# Patient Record
Sex: FEMALE | Race: WHITE | HISPANIC OR LATINO | URBAN - METROPOLITAN AREA
[De-identification: names, ages, dates, MRNs, and addresses within clinical notes are randomized per-mention and may not be internally consistent; named-entity substitution may affect disease eponyms.]

---

## 2021-03-19 ENCOUNTER — OFFICE VISIT (OUTPATIENT)
Dept: URBAN - METROPOLITAN AREA CLINIC 78 | Facility: CLINIC | Age: 31
End: 2021-03-19
Payer: SELF-PAY

## 2021-03-19 ENCOUNTER — TELEPHONE ENCOUNTER (OUTPATIENT)
Dept: URBAN - METROPOLITAN AREA CLINIC 78 | Facility: CLINIC | Age: 31
End: 2021-03-19

## 2021-03-19 ENCOUNTER — WEB ENCOUNTER (OUTPATIENT)
Dept: URBAN - METROPOLITAN AREA CLINIC 78 | Facility: CLINIC | Age: 31
End: 2021-03-19

## 2021-03-19 VITALS
RESPIRATION RATE: 16 BRPM | HEART RATE: 81 BPM | BODY MASS INDEX: 26.92 KG/M2 | WEIGHT: 161.6 LBS | HEIGHT: 65 IN | SYSTOLIC BLOOD PRESSURE: 122 MMHG | TEMPERATURE: 97.9 F | DIASTOLIC BLOOD PRESSURE: 84 MMHG

## 2021-03-19 DIAGNOSIS — R10.13 EPIGASTRIC PAIN: ICD-10-CM

## 2021-03-19 DIAGNOSIS — Z86.19 H/O HELICOBACTER INFECTION: ICD-10-CM

## 2021-03-19 DIAGNOSIS — R07.9 CHEST PAIN, UNSPECIFIED TYPE: ICD-10-CM

## 2021-03-19 PROCEDURE — 99203 OFFICE O/P NEW LOW 30 MIN: CPT | Performed by: INTERNAL MEDICINE

## 2021-03-19 RX ORDER — PANTOPRAZOLE SODIUM 40 MG/1
1 TABLET 30 MINUTES BEFORE BREAKFAST AND 30 MINUTES BEFORE DINNER TABLET, DELAYED RELEASE ORAL BID
Qty: 60 | Refills: 2 | OUTPATIENT
Start: 2021-03-19

## 2021-03-19 NOTE — HPI-TODAY'S VISIT:
The patient was referred to us by [facility] for evaluation of chest pain. A copy of this note will be sent to the referring physician.   The patient developed chest pain  along with a burning sensation along the epigastric and esophageal regions, radiating under the left breast a couple of weeks ago. She was seen by her PCP who started her on  Medrol Dosepak assuming it was muscular in nature and referred her to the cardiologist. She was told her heart looked normal. She was having a that point more of a retrosternal burning and was started on PPI by the cardiologist.  She had a negative stress test and is still awaiting the results of the ECHO.  She denied fever, upper respiratory symptoms, shortness of breath or difficulty in breathing.   She has been using Prilosec 20mg 2 tablets QAM with only very limited results.  She is very anxious and feels that stress may be contributing to some of her symptoms. She just started seeing a Psychotherapist. She is eating very healthy currently.  She otherwise denies any history of rectal bleeding, abdominal pain, constipation, diarrhea, bloating, anorexia or unintentional weight loss. She has not had heartburn or dysphagia. She has  not had further nausea or vomting.   She has a remote history of H pylori infection diagnosed at the age of 10.  The patient does not take blood thinners.     The patient has never had a colonoscopy previously.  There is no FH of colon cancer or colon polyps. There is  Summary of prior workup: - CXR 2/27/21: No acute disease.

## 2021-03-31 ENCOUNTER — OFFICE VISIT (OUTPATIENT)
Dept: URBAN - METROPOLITAN AREA CLINIC 98 | Facility: CLINIC | Age: 31
End: 2021-03-31

## 2021-04-01 ENCOUNTER — WEB ENCOUNTER (OUTPATIENT)
Dept: URBAN - METROPOLITAN AREA CLINIC 78 | Facility: CLINIC | Age: 31
End: 2021-04-01

## 2021-06-23 ENCOUNTER — OFFICE VISIT (OUTPATIENT)
Dept: URBAN - METROPOLITAN AREA SURGERY CENTER 15 | Facility: SURGERY CENTER | Age: 31
End: 2021-06-23

## 2021-07-13 ENCOUNTER — OFFICE VISIT (OUTPATIENT)
Dept: URBAN - METROPOLITAN AREA CLINIC 78 | Facility: CLINIC | Age: 31
End: 2021-07-13
Payer: SELF-PAY

## 2021-07-13 VITALS
BODY MASS INDEX: 24.12 KG/M2 | TEMPERATURE: 97.8 F | HEART RATE: 79 BPM | HEIGHT: 65 IN | SYSTOLIC BLOOD PRESSURE: 118 MMHG | WEIGHT: 144.8 LBS | DIASTOLIC BLOOD PRESSURE: 76 MMHG

## 2021-07-13 DIAGNOSIS — E73.9 LACTOSE INTOLERANCE: ICD-10-CM

## 2021-07-13 DIAGNOSIS — R07.9 CHEST PAIN, UNSPECIFIED TYPE: ICD-10-CM

## 2021-07-13 DIAGNOSIS — R63.4 UNINTENTIONAL WEIGHT LOSS: ICD-10-CM

## 2021-07-13 DIAGNOSIS — R10.13 EPIGASTRIC PAIN: ICD-10-CM

## 2021-07-13 PROCEDURE — 99214 OFFICE O/P EST MOD 30 MIN: CPT | Performed by: INTERNAL MEDICINE

## 2021-07-13 RX ORDER — FAMOTIDINE 40 MG/1
1 TABLET TABLET, FILM COATED ORAL
Qty: 60 | Refills: 2 | OUTPATIENT
Start: 2021-07-13

## 2021-07-13 RX ORDER — PANTOPRAZOLE SODIUM 40 MG/1
1 TABLET 30 MINUTES BEFORE BREAKFAST AND 30 MINUTES BEFORE DINNER TABLET, DELAYED RELEASE ORAL BID
Qty: 60 | Refills: 2 | Status: ACTIVE | COMMUNITY
Start: 2021-03-19

## 2021-07-13 NOTE — HPI-TODAY'S VISIT:
The patient was referred to us by [facility] for evaluation of chest pain. A copy of this note will be sent to the referring physician.   The patient developed chest pain  along with a burning sensation along the epigastric and esophageal regions, radiating under the left breast a couple of weeks ago. She was seen by her PCP who started her on  Medrol Dosepak assuming it was muscular in nature and referred her to the cardiologist. She was told her heart looked normal. She was having a that point more of a retrosternal burning and was started on PPI by the cardiologist.  She had a negative stress test and ECHO.  She denied fever, upper respiratory symptoms, shortness of breath or difficulty in breathing.   She is very anxious and feels that stress may be contributing to some of her symptoms. She just started seeing a Psychotherapist.  She was feeling worse and worse and was unable to get in for an EGD with me in a timely fashion, hence  ended up having it with Dr. Kenzie Padron. She was diagnosed with H pylori once again and was treated with Metronidazole + Tetracycline + PeptoBismol. She had a follow up H pylori breath test  while on PPIs that was negative (doubt validity).  She has continued limiting sevetal foods from her diet and did lose a total of 20lbs but her weight has since stabilized.  She has been avoiding fatty meals and red meats.   She has continued noticing a retrosternal burning as well as epigastric and RUQ burning.   She otherwise denies any history of rectal bleeding, abdominal pain, constipation, diarrhea, bloating or anorexia. She has not had heartburn or dysphagia. She has  not had further nausea or vomting.   She has a remote history of H pylori infection diagnosed at the age of 10.  The patient does not take blood thinners.     The patient has never had a colonoscopy previously.  There is no FH of colon cancer or colon polyps.    Summary of prior workup: - EGD by Dr. Kenzie Padron on 4/16/21: Grade A esophagitis. H pylori gastritis without GIM. Normal duodenum. - CXR 2/27/21: No acute disease.

## 2021-07-21 ENCOUNTER — OFFICE VISIT (OUTPATIENT)
Dept: URBAN - METROPOLITAN AREA CLINIC 77 | Facility: CLINIC | Age: 31
End: 2021-07-21
Payer: SELF-PAY

## 2021-07-21 DIAGNOSIS — R10.13 EPIGASTRIC ABDOMINAL PAIN: ICD-10-CM

## 2021-07-21 PROCEDURE — 76705 ECHO EXAM OF ABDOMEN: CPT | Performed by: INTERNAL MEDICINE

## 2021-07-21 RX ORDER — PANTOPRAZOLE SODIUM 40 MG/1
1 TABLET 30 MINUTES BEFORE BREAKFAST AND 30 MINUTES BEFORE DINNER TABLET, DELAYED RELEASE ORAL BID
Qty: 60 | Refills: 2 | Status: ACTIVE | COMMUNITY
Start: 2021-03-19

## 2021-07-21 RX ORDER — FAMOTIDINE 40 MG/1
1 TABLET TABLET, FILM COATED ORAL
Qty: 60 | Refills: 2 | Status: ACTIVE | COMMUNITY
Start: 2021-07-13

## 2021-08-13 ENCOUNTER — OFFICE VISIT (OUTPATIENT)
Dept: URBAN - METROPOLITAN AREA CLINIC 77 | Facility: CLINIC | Age: 31
End: 2021-08-13
Payer: SELF-PAY

## 2021-08-13 DIAGNOSIS — A04.8 HELICOBACTER PYLORI (H. PYLORI): ICD-10-CM

## 2021-08-13 PROCEDURE — 83014 H PYLORI DRUG ADMIN: CPT | Performed by: INTERNAL MEDICINE

## 2021-08-20 ENCOUNTER — OFFICE VISIT (OUTPATIENT)
Dept: URBAN - METROPOLITAN AREA CLINIC 114 | Facility: CLINIC | Age: 31
End: 2021-08-20
Payer: SELF-PAY

## 2021-08-20 DIAGNOSIS — A04.8 HELICOBACTER PYLORI (H. PYLORI): ICD-10-CM

## 2021-08-20 PROCEDURE — 83013 H PYLORI (C-13) BREATH: CPT | Performed by: INTERNAL MEDICINE

## 2021-08-24 ENCOUNTER — OFFICE VISIT (OUTPATIENT)
Dept: URBAN - METROPOLITAN AREA CLINIC 78 | Facility: CLINIC | Age: 31
End: 2021-08-24

## 2021-09-02 ENCOUNTER — DASHBOARD ENCOUNTERS (OUTPATIENT)
Age: 31
End: 2021-09-02

## 2021-09-02 ENCOUNTER — OFFICE VISIT (OUTPATIENT)
Dept: URBAN - METROPOLITAN AREA CLINIC 78 | Facility: CLINIC | Age: 31
End: 2021-09-02
Payer: SELF-PAY

## 2021-09-02 VITALS
SYSTOLIC BLOOD PRESSURE: 126 MMHG | BODY MASS INDEX: 25.09 KG/M2 | DIASTOLIC BLOOD PRESSURE: 84 MMHG | HEART RATE: 82 BPM | WEIGHT: 150.6 LBS | TEMPERATURE: 96.9 F | HEIGHT: 65 IN

## 2021-09-02 DIAGNOSIS — R10.13 EPIGASTRIC PAIN: ICD-10-CM

## 2021-09-02 DIAGNOSIS — R07.89 ACUTE CHEST WALL PAIN: ICD-10-CM

## 2021-09-02 DIAGNOSIS — E73.9 LACTOSE INTOLERANCE: ICD-10-CM

## 2021-09-02 DIAGNOSIS — Z86.19 H/O HELICOBACTER INFECTION: ICD-10-CM

## 2021-09-02 DIAGNOSIS — R63.4 UNINTENTIONAL WEIGHT LOSS: ICD-10-CM

## 2021-09-02 PROBLEM — 782415009: Status: ACTIVE | Noted: 2021-07-13

## 2021-09-02 PROCEDURE — 99214 OFFICE O/P EST MOD 30 MIN: CPT | Performed by: INTERNAL MEDICINE

## 2021-09-02 RX ORDER — FAMOTIDINE 40 MG/1
1 TABLET TABLET, FILM COATED ORAL
Qty: 60 | Refills: 2 | Status: ACTIVE | COMMUNITY
Start: 2021-07-13

## 2021-09-02 RX ORDER — FAMOTIDINE 40 MG/1
1 TABLET TABLET, FILM COATED ORAL
Qty: 60 | Refills: 6 | OUTPATIENT

## 2021-09-02 RX ORDER — PANTOPRAZOLE SODIUM 40 MG/1
1 TABLET 30 MINUTES BEFORE BREAKFAST AND 30 MINUTES BEFORE DINNER TABLET, DELAYED RELEASE ORAL BID
Qty: 60 | Refills: 2 | Status: ACTIVE | COMMUNITY
Start: 2021-03-19

## 2021-09-02 NOTE — HPI-TODAY'S VISIT:
The patient was referred to us by [facility] for evaluation of chest pain. A copy of this note will be sent to the referring physician.   The patient developed chest pain  along with a burning sensation along the epigastric and esophageal regions, radiating under the left breast a couple of weeks ago. She was seen by her PCP who started her on  Medrol Dosepak assuming it was muscular in nature and referred her to the cardiologist. She was told her heart looked normal. She was having a that point more of a retrosternal burning and was started on PPI by the cardiologist.  She had a negative stress test and ECHO.   She is very anxious and feels that stress may be contributing to some of her symptoms. She is seeing a Psychotherapist.  She ended up having an EGD with Dr. Kenzie Padron. She was diagnosed with H pylori once again and was treated with Metronidazole + Tetracycline + PeptoBismol. She had a follow up H pylori breath test  while on PPIs that was negative (doubt validity). I repeated a UBT  off PPIs which was negative. We reviewed these results today.   She has been avoiding fatty meals and red meats.   She is feeling 60% better. She is having more good days than bad days. although she admits that she still has some epigastric burning here and there. She admits to drinking carbonated beverages often.   She otherwise denies any history of rectal bleeding, abdominal pain, constipation, diarrhea, bloating or anorexia. She has not had heartburn or dysphagia. She has  not had further nausea or vomting.   She has a remote history of H pylori infection diagnosed at the age of 10.  The patient does not take blood thinners.     The patient has never had a colonoscopy previously.  There is no FH of colon cancer or colon polyps.    Summary of prior workup: - - H pylori breath test negative on 8/19/21.  - Normal RUQ US on 7/21/21. - EGD by Dr. Kenzie Padron on 4/16/21: Grade A esophagitis. H pylori gastritis without GIM. Normal duodenum. - CXR 2/27/21: No acute disease.

## 2025-07-21 NOTE — PHYSICAL EXAM HENT:
Head,  normocephalic,  atraumatic,  Face,  Face within normal limits,  Ears,  External ears within normal limits,  Nose/Nasopharynx,  External nose  normal appearance,  nares patent,  no nasal discharge,  Mouth and Throat,  Oral cavity appearance normal,  Breath odor normal,  Lips,  Appearance normal Patient scheduled with Dr Calzada on 08/01/25.